# Patient Record
Sex: FEMALE | Race: WHITE | NOT HISPANIC OR LATINO | Employment: FULL TIME | URBAN - METROPOLITAN AREA
[De-identification: names, ages, dates, MRNs, and addresses within clinical notes are randomized per-mention and may not be internally consistent; named-entity substitution may affect disease eponyms.]

---

## 2023-01-21 PROBLEM — G47.00 INSOMNIA: Status: ACTIVE | Noted: 2023-01-21

## 2023-01-21 PROBLEM — R92.30 DENSE BREAST TISSUE ON MAMMOGRAM: Status: ACTIVE | Noted: 2023-01-21

## 2023-01-21 PROBLEM — R53.83 FATIGUE: Status: ACTIVE | Noted: 2023-01-21

## 2023-01-21 PROBLEM — F43.20 ADJUSTMENT DISORDER: Status: ACTIVE | Noted: 2023-01-21

## 2023-01-21 PROBLEM — T78.40XA ALLERGY: Status: ACTIVE | Noted: 2023-01-21

## 2023-01-21 PROBLEM — G43.909 MIGRAINES: Status: ACTIVE | Noted: 2023-01-21

## 2023-01-21 RX ORDER — TRAZODONE HYDROCHLORIDE 100 MG/1
2 TABLET ORAL NIGHTLY
COMMUNITY
End: 2023-11-22

## 2023-01-21 RX ORDER — MONTELUKAST SODIUM 10 MG/1
1 TABLET ORAL NIGHTLY
COMMUNITY
Start: 2022-07-06 | End: 2023-11-27

## 2023-01-21 RX ORDER — UBROGEPANT 50 MG/1
TABLET ORAL
COMMUNITY
Start: 2022-09-13 | End: 2024-03-08 | Stop reason: SDUPTHER

## 2023-01-21 RX ORDER — LORATADINE 10 MG/1
CAPSULE, LIQUID FILLED ORAL
COMMUNITY
Start: 2022-09-13 | End: 2024-03-08 | Stop reason: WASHOUT

## 2023-01-21 RX ORDER — ESCITALOPRAM OXALATE 10 MG/1
1 TABLET ORAL DAILY
COMMUNITY
End: 2023-11-22

## 2023-03-07 ENCOUNTER — OFFICE VISIT (OUTPATIENT)
Dept: PRIMARY CARE | Facility: CLINIC | Age: 53
End: 2023-03-07
Payer: COMMERCIAL

## 2023-03-07 VITALS
RESPIRATION RATE: 18 BRPM | SYSTOLIC BLOOD PRESSURE: 122 MMHG | HEIGHT: 63 IN | DIASTOLIC BLOOD PRESSURE: 82 MMHG | HEART RATE: 56 BPM | TEMPERATURE: 98.6 F | WEIGHT: 156 LBS | OXYGEN SATURATION: 98 % | BODY MASS INDEX: 27.64 KG/M2

## 2023-03-07 DIAGNOSIS — Z00.00 ANNUAL PHYSICAL EXAM: Primary | ICD-10-CM

## 2023-03-07 PROCEDURE — 99396 PREV VISIT EST AGE 40-64: CPT | Performed by: FAMILY MEDICINE

## 2023-03-07 NOTE — PROGRESS NOTES
"Subjective   Andria Titus is a 52 y.o. female who presents for Annual Exam (PT here for CPE).  HPI  Started working out w dtr x 11 wk   Clothes are looser but has not lost wt  Tried coming off lexapro for 2 mo but felt worse so she restarted it   Will get shingrix at someone   Still has IUD due for removal this yr, will see GYN in sept     Objective   /82   Pulse 56   Temp 37 °C (98.6 °F)   Resp 18   Ht 1.61 m (5' 3.39\")   Wt 70.8 kg (156 lb)   SpO2 98%   BMI 27.30 kg/m²    Physical Exam  General: NAD  HEENT:NCAT, PERRLA, nml OP, b/l TM clear, patent nares   Neck: no cervical EL  Heart: RRR no murmur, no edema   Lungs: CTA b/l, no wheeze or rhonchi   GI: abd soft, nontender, nondistended.   Breast: symmetric, no masses, rashes, lesions, axillary EL,   B/l implants   MSK: no c/c/e. nml gait   Skin: warm and dry  Psych: cooperative, appropriate affect  Neuro: speech clear. A&Ox3   Assessment/Plan   Problem List Items Addressed This Visit    None  Visit Diagnoses       Annual physical exam    -  Primary    Relevant Orders    Comprehensive Metabolic Panel    Hemoglobin A1C    Lipid Panel    TSH with reflex to Free T4 if abnormal    CBC and Auto Differential        CPE:overall doing well. Discussed diet/ex changes  BMI: 27   VACC: reviewed, plans to get shingrix in a few years   COLON CA SCRN: due 3/2023  LABS: ordered, will do after making lifestyle changes for 9 months  PAP: rpt 9/2023 d/t ascus   MAMMO: upcoming pt has req   DEXA: never   RTC yearly or prn      "

## 2023-11-22 DIAGNOSIS — G47.09 OTHER INSOMNIA: Primary | ICD-10-CM

## 2023-11-22 DIAGNOSIS — F43.20 ADJUSTMENT DISORDER, UNSPECIFIED TYPE: ICD-10-CM

## 2023-11-22 RX ORDER — TRAZODONE HYDROCHLORIDE 100 MG/1
200 TABLET ORAL NIGHTLY
Qty: 180 TABLET | Refills: 3 | Status: SHIPPED | OUTPATIENT
Start: 2023-11-22 | End: 2024-02-05 | Stop reason: SDUPTHER

## 2023-11-22 RX ORDER — ESCITALOPRAM OXALATE 10 MG/1
10 TABLET ORAL DAILY
Qty: 90 TABLET | Refills: 3 | Status: SHIPPED | OUTPATIENT
Start: 2023-11-22 | End: 2024-02-05 | Stop reason: SDUPTHER

## 2023-11-27 DIAGNOSIS — J30.9 ALLERGIC RHINITIS, UNSPECIFIED SEASONALITY, UNSPECIFIED TRIGGER: Primary | ICD-10-CM

## 2023-11-27 RX ORDER — MONTELUKAST SODIUM 10 MG/1
10 TABLET ORAL NIGHTLY
Qty: 90 TABLET | Refills: 3 | Status: SHIPPED | OUTPATIENT
Start: 2023-11-27 | End: 2024-02-05 | Stop reason: SDUPTHER

## 2023-12-22 ENCOUNTER — APPOINTMENT (OUTPATIENT)
Dept: RADIOLOGY | Facility: CLINIC | Age: 53
End: 2023-12-22
Payer: COMMERCIAL

## 2023-12-22 DIAGNOSIS — Z12.31 SCREENING MAMMOGRAM FOR BREAST CANCER: ICD-10-CM

## 2024-02-05 DIAGNOSIS — J30.9 ALLERGIC RHINITIS, UNSPECIFIED SEASONALITY, UNSPECIFIED TRIGGER: ICD-10-CM

## 2024-02-05 DIAGNOSIS — F43.20 ADJUSTMENT DISORDER, UNSPECIFIED TYPE: ICD-10-CM

## 2024-02-05 DIAGNOSIS — G47.09 OTHER INSOMNIA: ICD-10-CM

## 2024-02-05 RX ORDER — MONTELUKAST SODIUM 10 MG/1
10 TABLET ORAL NIGHTLY
Qty: 90 TABLET | Refills: 3 | Status: SHIPPED | OUTPATIENT
Start: 2024-02-05 | End: 2024-02-12 | Stop reason: SDUPTHER

## 2024-02-05 RX ORDER — ESCITALOPRAM OXALATE 10 MG/1
10 TABLET ORAL DAILY
Qty: 90 TABLET | Refills: 3 | Status: SHIPPED | OUTPATIENT
Start: 2024-02-05 | End: 2024-02-12 | Stop reason: SDUPTHER

## 2024-02-05 RX ORDER — TRAZODONE HYDROCHLORIDE 100 MG/1
200 TABLET ORAL NIGHTLY
Qty: 180 TABLET | Refills: 3 | Status: SHIPPED | OUTPATIENT
Start: 2024-02-05 | End: 2024-02-12 | Stop reason: SDUPTHER

## 2024-02-05 NOTE — TELEPHONE ENCOUNTER
Monteleukast 10 mg by mouth once nightly  Trazodone 200 mg by mouth once nightly  Escitalopram 15 mg by mouth once daily

## 2024-02-12 DIAGNOSIS — G47.09 OTHER INSOMNIA: ICD-10-CM

## 2024-02-12 DIAGNOSIS — F43.20 ADJUSTMENT DISORDER, UNSPECIFIED TYPE: ICD-10-CM

## 2024-02-12 DIAGNOSIS — J30.9 ALLERGIC RHINITIS, UNSPECIFIED SEASONALITY, UNSPECIFIED TRIGGER: ICD-10-CM

## 2024-02-12 RX ORDER — TRAZODONE HYDROCHLORIDE 100 MG/1
200 TABLET ORAL NIGHTLY
Qty: 90 TABLET | Refills: 3 | Status: SHIPPED | OUTPATIENT
Start: 2024-02-12 | End: 2024-03-08 | Stop reason: SDUPTHER

## 2024-02-12 RX ORDER — ESCITALOPRAM OXALATE 10 MG/1
10 TABLET ORAL DAILY
Qty: 90 TABLET | Refills: 3 | Status: SHIPPED | OUTPATIENT
Start: 2024-02-12 | End: 2024-03-08 | Stop reason: SDUPTHER

## 2024-02-12 RX ORDER — MONTELUKAST SODIUM 10 MG/1
10 TABLET ORAL NIGHTLY
Qty: 90 TABLET | Refills: 3 | Status: SHIPPED | OUTPATIENT
Start: 2024-02-12 | End: 2024-03-08 | Stop reason: SDUPTHER

## 2024-02-12 NOTE — TELEPHONE ENCOUNTER
Propose 1 year refill for trazadone, excitalopram and montelukast to [facility]

## 2024-03-08 ENCOUNTER — OFFICE VISIT (OUTPATIENT)
Dept: PRIMARY CARE | Facility: CLINIC | Age: 54
End: 2024-03-08
Payer: COMMERCIAL

## 2024-03-08 VITALS
HEART RATE: 70 BPM | OXYGEN SATURATION: 95 % | DIASTOLIC BLOOD PRESSURE: 74 MMHG | HEIGHT: 63 IN | RESPIRATION RATE: 16 BRPM | BODY MASS INDEX: 26.93 KG/M2 | WEIGHT: 152 LBS | SYSTOLIC BLOOD PRESSURE: 108 MMHG | TEMPERATURE: 98.4 F

## 2024-03-08 DIAGNOSIS — F90.8 ATTENTION DEFICIT HYPERACTIVITY DISORDER (ADHD), OTHER TYPE: ICD-10-CM

## 2024-03-08 DIAGNOSIS — G43.009 COMMON MIGRAINE WITHOUT INTRACTABILITY: ICD-10-CM

## 2024-03-08 DIAGNOSIS — J30.9 ALLERGIC RHINITIS, UNSPECIFIED SEASONALITY, UNSPECIFIED TRIGGER: ICD-10-CM

## 2024-03-08 DIAGNOSIS — F43.20 ADJUSTMENT DISORDER, UNSPECIFIED TYPE: ICD-10-CM

## 2024-03-08 DIAGNOSIS — Z00.00 ANNUAL PHYSICAL EXAM: Primary | ICD-10-CM

## 2024-03-08 DIAGNOSIS — G47.09 OTHER INSOMNIA: ICD-10-CM

## 2024-03-08 PROBLEM — F90.9 ATTENTION DEFICIT HYPERACTIVITY DISORDER (ADHD): Status: ACTIVE | Noted: 2024-03-08

## 2024-03-08 PROCEDURE — 1036F TOBACCO NON-USER: CPT | Performed by: FAMILY MEDICINE

## 2024-03-08 PROCEDURE — 99396 PREV VISIT EST AGE 40-64: CPT | Performed by: FAMILY MEDICINE

## 2024-03-08 RX ORDER — RIZATRIPTAN BENZOATE 10 MG/1
10 TABLET ORAL ONCE AS NEEDED
Qty: 9 TABLET | Refills: 11 | Status: SHIPPED | OUTPATIENT
Start: 2024-03-08 | End: 2025-03-08

## 2024-03-08 RX ORDER — UBROGEPANT 50 MG/1
50 TABLET ORAL AS NEEDED
Qty: 16 TABLET | Refills: 11 | Status: SHIPPED | OUTPATIENT
Start: 2024-03-08

## 2024-03-08 RX ORDER — CETIRIZINE HYDROCHLORIDE 10 MG/1
10 TABLET ORAL DAILY
Qty: 90 TABLET | Refills: 3 | Status: SHIPPED | OUTPATIENT
Start: 2024-03-08 | End: 2025-03-08

## 2024-03-08 RX ORDER — MONTELUKAST SODIUM 10 MG/1
10 TABLET ORAL NIGHTLY
Qty: 90 TABLET | Refills: 3 | Status: SHIPPED | OUTPATIENT
Start: 2024-03-08

## 2024-03-08 RX ORDER — TRAZODONE HYDROCHLORIDE 100 MG/1
200 TABLET ORAL NIGHTLY
Qty: 180 TABLET | Refills: 3 | Status: SHIPPED | OUTPATIENT
Start: 2024-03-08 | End: 2025-03-08

## 2024-03-08 RX ORDER — FLUTICASONE PROPIONATE 50 MCG
2 SPRAY, SUSPENSION (ML) NASAL DAILY
Qty: 16 G | Refills: 11 | Status: SHIPPED | OUTPATIENT
Start: 2024-03-08 | End: 2025-03-08

## 2024-03-08 RX ORDER — ESCITALOPRAM OXALATE 10 MG/1
15 TABLET ORAL DAILY
Qty: 135 TABLET | Refills: 3 | Status: SHIPPED | OUTPATIENT
Start: 2024-03-08 | End: 2025-03-08

## 2024-03-08 ASSESSMENT — PROMIS GLOBAL HEALTH SCALE
RATE_SOCIAL_SATISFACTION: VERY GOOD
RATE_GENERAL_HEALTH: VERY GOOD
EMOTIONAL_PROBLEMS: RARELY
CARRYOUT_PHYSICAL_ACTIVITIES: COMPLETELY
RATE_MENTAL_HEALTH: VERY GOOD
CARRYOUT_SOCIAL_ACTIVITIES: EXCELLENT
RATE_AVERAGE_PAIN: 1
RATE_QUALITY_OF_LIFE: VERY GOOD
RATE_PHYSICAL_HEALTH: VERY GOOD
RATE_AVERAGE_FATIGUE: MODERATE

## 2024-03-08 NOTE — PROGRESS NOTES
"Subjective   Andria Titus is a 53 y.o. female who presents for Annual Exam and Follow-up.  HPI    Making lifestyle changes. Mostly vegetarian. No alcohol.     Migraines last 7-10 d if can't break w in 24 hrs. Will have prodrome, inc in duration and severity. Happens q 2 mo.     No period 8 mo then happened again. Still has IUD. Seeing GYN soon.     Saw psych for inc anxiety through work. Was doing therapy. Dx w ADHD starting ADHD therapy.     Mom b/l ovarian ca age 43. Did genetic testing and neg.     Considering shingrix   Current Outpatient Medications on File Prior to Visit   Medication Sig Dispense Refill    [DISCONTINUED] cetirizine HCl (ZYRTEC ORAL) Take by mouth. ZyrTEC Allergy Tabs      [DISCONTINUED] escitalopram (Lexapro) 10 mg tablet Take 1 tablet (10 mg) by mouth once daily. 90 tablet 3    [DISCONTINUED] FLUTICASONE PROPIONATE NASL Flonase SUSP   Refills: 0       Active      [DISCONTINUED] montelukast (Singulair) 10 mg tablet Take 1 tablet (10 mg) by mouth once daily at bedtime. 90 tablet 3    [DISCONTINUED] traZODone (Desyrel) 100 mg tablet Take 2 tablets (200 mg) by mouth once daily at bedtime. 90 tablet 3    [DISCONTINUED] ubrogepant (Ubrelvy) 50 mg tablet Take by mouth.      [DISCONTINUED] loratadine (Claritin Liqui-Gel) 10 mg capsule Take by mouth.      [DISCONTINUED] ubrogepant (Ubrelvy) 100 mg tablet tablet Take 1 tablet (100 mg) by mouth in the morning.       No current facility-administered medications on file prior to visit.                  Objective   /74 (BP Location: Left arm)   Pulse 70   Temp 36.9 °C (98.4 °F)   Resp 16   Ht 1.61 m (5' 3.39\")   Wt 68.9 kg (152 lb)   SpO2 95%   BMI 26.60 kg/m²    Physical Exam  General: NAD  HEENT:NCAT, PERRLA, nml OP, b/l TM clear   Neck: no cervical EL  Heart: RRR no murmur, no edema   Lungs: CTA b/l, no wheeze or rhonchi   GI: abd soft, nontender, nondistended.   MSK: no c/c/e. nml gait   Skin: warm and dry  Psych: cooperative, " appropriate affect  Neuro: speech clear. A&Ox3  Assessment/Plan   Problem List Items Addressed This Visit       Adjustment disorder  -improved on lexapro 15 , refilled     Relevant Medications    escitalopram (Lexapro) 10 mg tablet    Insomnia  -stable on prn trazodone 200 mg, refilled     Relevant Medications    traZODone (Desyrel) 100 mg tablet    Attention deficit hyperactivity disorder (ADHD)  Recent Dx w/in the last yr w psych  Starting behav mod therapy       Common migraine without intractability  Doesn't meet criteria for Ppx  Will cont w ubrelvy but add prn maxalt if no improvement as can be severe/lengthy if not resolved quickly       Relevant Medications    ubrogepant (Ubrelvy) 50 mg tablet    rizatriptan (Maxalt) 10 mg tablet    Allergic rhinitis  Breakthrough Sx despite zyrtec, flonase, singulair.  RF to allergist, considering immunotherapy       Relevant Medications    cetirizine (ZyrTEC) 10 mg tablet    fluticasone (Flonase) 50 mcg/actuation nasal spray    montelukast (Singulair) 10 mg tablet    Other Relevant Orders    Referral to Allergy    Annual physical exam - Primary  CPE:overall doing well. Discussed diet/ex changes  BMI: 26  VACC: reviewed, tdap/flu UTD considering shingrix/covid  COLON CA SCRN: UTD rpt 2032  LABS: ordered for 6 mo after lifestyle changes  PAP: due, upomcing appt GYN   MAMMO: will sudha   DEXA: 67  RTC 6 mo    Relevant Orders    CBC and Auto Differential    Comprehensive Metabolic Panel    Hemoglobin A1C    Lipid Panel    TSH with reflex to Free T4 if abnormal    BMI 26: working on lifestlye changes, f/up 6 mo

## 2024-05-16 ENCOUNTER — OFFICE VISIT (OUTPATIENT)
Dept: OBSTETRICS AND GYNECOLOGY | Facility: CLINIC | Age: 54
End: 2024-05-16
Payer: COMMERCIAL

## 2024-05-16 VITALS
WEIGHT: 165 LBS | HEIGHT: 63 IN | DIASTOLIC BLOOD PRESSURE: 60 MMHG | BODY MASS INDEX: 29.23 KG/M2 | SYSTOLIC BLOOD PRESSURE: 108 MMHG

## 2024-05-16 DIAGNOSIS — Z12.4 ROUTINE CERVICAL SMEAR: ICD-10-CM

## 2024-05-16 DIAGNOSIS — Z01.419 WELL WOMAN EXAM WITH ROUTINE GYNECOLOGICAL EXAM: Primary | ICD-10-CM

## 2024-05-16 DIAGNOSIS — Z12.31 ENCOUNTER FOR SCREENING MAMMOGRAM FOR MALIGNANT NEOPLASM OF BREAST: ICD-10-CM

## 2024-05-16 PROCEDURE — 88175 CYTOPATH C/V AUTO FLUID REDO: CPT

## 2024-05-16 PROCEDURE — 87624 HPV HI-RISK TYP POOLED RSLT: CPT

## 2024-05-16 PROCEDURE — 99396 PREV VISIT EST AGE 40-64: CPT | Performed by: OBSTETRICS & GYNECOLOGY

## 2024-05-16 NOTE — PROGRESS NOTES
"Siomara Titus is a 54 y.o. female who is here for a routine well woman exam.     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    She was last seen in March 2022.    Bleeding irregular. LMP 3 weeks ago.  She has a Mirena; placed 5/2015    Mom had premenopausal ovarian CA. Survivor and disease free. No other FH of female cancers.   She did get genetic screening done in the last 2 years and it was negative.    No breast concerns today.    ROS:  Constitutional: Negative  Eyes: Negative  HEENT: Negative  Respiratory: Negative  Cardiovascular: Negative  GI: Negative  : mild dryness  Endocrine: Negative  Breast: Negative  Musculoskeletal: Negative  Skin: Negative  Hematologic: Negative  Neurologic: Negative  Psychiatric: Negative     Objective   /60   Ht 1.6 m (5' 3\")   Wt 74.8 kg (165 lb)   LMP  (LMP Unknown) Comment: IUD  BMI 29.23 kg/m²     Constitutional: Alert and in no acute distress.   Pulmonary: No respiratory distress.   Chest: Breasts: Normal appearance, no nipple discharge, and no skin changes. Palpation of breasts and axillae: No palpable mass and no axillary lymphadenopathy.  Abdomen: Soft, nontender; no abdominal mass palpated.   Genitourinary:   External genitalia: Normal appearance.  No inguinal lymphadenopathy.   Bartholin's, Urethral, and Skenes Glands: Normal.   Urethra: Normal. Bladder: Normal on palpation.   Vagina: Normal.   Cervix: Normal appearance, nontender. IUD strings not visible.  Uterus/Adnexa: Normal size, mobile uterus. Nontender, no masses palpated in adnexa  Inspection of perianal area: Normal.  Musculoskeletal: No joint swelling seen, normal movements of all extremities.  Skin: Normal skin color and pigmentation, normal skin turgor, and no rash.  Psychiatric: Alert and oriented x 3. Affect normal to patient's baseline. Mood: Appropriate.       Assessment/Plan   1. Well woman exam with " routine gynecological exam  2. Routine cervical smear  - THINPREP PAP TEST  - HPV DNA High Risk With Genotype  3. Encounter for screening mammogram for malignant neoplasm of breast  - BI mammo bilateral screening tomosynthesis; Future    Breast and pelvic exam findings today were normal.  Pap testing was done today.   She was given an order for her next screening mammogram.  We discussed removal of her IUD. She will return for this since IUD strings not easily seen today for removal.  Advised her to call with any new problems or questions.  Follow up in 1 year for the next well woman visit.

## 2024-05-30 LAB
CYTOLOGY CMNT CVX/VAG CYTO-IMP: NORMAL
HPV HR 12 DNA GENITAL QL NAA+PROBE: NEGATIVE
HPV HR GENOTYPES PNL CVX NAA+PROBE: NEGATIVE
HPV16 DNA SPEC QL NAA+PROBE: NEGATIVE
HPV18 DNA SPEC QL NAA+PROBE: NEGATIVE
LAB AP HPV GENOTYPE QUESTION: YES
LAB AP HPV HR: NORMAL
LABORATORY COMMENT REPORT: NORMAL
PATH REPORT.TOTAL CANCER: NORMAL

## 2024-06-05 ENCOUNTER — LAB (OUTPATIENT)
Dept: LAB | Facility: LAB | Age: 54
End: 2024-06-05
Payer: COMMERCIAL

## 2024-06-05 DIAGNOSIS — Z01.84 IMMUNITY STATUS TESTING: ICD-10-CM

## 2024-06-05 LAB
HBV SURFACE AB SER-ACNC: 4.6 MIU/ML
MEV IGG SER QL IA: POSITIVE
MUMPS IGG ANTIBODY INDEX: 3.5 IA
MUV IGG SER IA-ACNC: POSITIVE
RUBEOLA IGG ANTIBODY INDEX: 2 IA
RUBV IGG SERPL IA-ACNC: 1.6 IA
RUBV IGG SERPL QL IA: POSITIVE
VARICELLA ZOSTER IGG INDEX: 1.9 IA
VZV IGG SER QL IA: POSITIVE

## 2024-06-05 PROCEDURE — 86735 MUMPS ANTIBODY: CPT

## 2024-06-05 PROCEDURE — 86765 RUBEOLA ANTIBODY: CPT

## 2024-06-05 PROCEDURE — 86706 HEP B SURFACE ANTIBODY: CPT

## 2024-06-05 PROCEDURE — 86787 VARICELLA-ZOSTER ANTIBODY: CPT

## 2024-06-05 PROCEDURE — 86317 IMMUNOASSAY INFECTIOUS AGENT: CPT

## 2024-06-05 PROCEDURE — 36415 COLL VENOUS BLD VENIPUNCTURE: CPT

## 2024-06-06 ENCOUNTER — HOSPITAL ENCOUNTER (OUTPATIENT)
Dept: RADIOLOGY | Facility: HOSPITAL | Age: 54
Discharge: HOME | End: 2024-06-06
Payer: COMMERCIAL

## 2024-06-06 VITALS — HEIGHT: 63 IN | BODY MASS INDEX: 29.23 KG/M2 | WEIGHT: 165 LBS

## 2024-06-06 DIAGNOSIS — Z12.31 ENCOUNTER FOR SCREENING MAMMOGRAM FOR MALIGNANT NEOPLASM OF BREAST: ICD-10-CM

## 2024-06-06 PROCEDURE — 77067 SCR MAMMO BI INCL CAD: CPT | Performed by: RADIOLOGY

## 2024-06-06 PROCEDURE — 77063 BREAST TOMOSYNTHESIS BI: CPT | Performed by: RADIOLOGY

## 2024-06-06 PROCEDURE — 77067 SCR MAMMO BI INCL CAD: CPT

## 2024-09-13 ENCOUNTER — APPOINTMENT (OUTPATIENT)
Dept: PRIMARY CARE | Facility: CLINIC | Age: 54
End: 2024-09-13
Payer: COMMERCIAL